# Patient Record
Sex: MALE | Race: WHITE | NOT HISPANIC OR LATINO | URBAN - METROPOLITAN AREA
[De-identification: names, ages, dates, MRNs, and addresses within clinical notes are randomized per-mention and may not be internally consistent; named-entity substitution may affect disease eponyms.]

---

## 2017-05-14 ENCOUNTER — INPATIENT (INPATIENT)
Facility: HOSPITAL | Age: 22
LOS: 0 days | Discharge: LEFT BEFORE DISCHARGE | DRG: 918 | End: 2017-05-14
Attending: HOSPITALIST | Admitting: HOSPITALIST
Payer: COMMERCIAL

## 2017-05-14 VITALS
DIASTOLIC BLOOD PRESSURE: 77 MMHG | HEART RATE: 77 BPM | RESPIRATION RATE: 19 BRPM | OXYGEN SATURATION: 100 % | TEMPERATURE: 98 F | SYSTOLIC BLOOD PRESSURE: 131 MMHG

## 2017-05-14 VITALS
DIASTOLIC BLOOD PRESSURE: 94 MMHG | HEIGHT: 72.83 IN | HEART RATE: 110 BPM | WEIGHT: 187.39 LBS | OXYGEN SATURATION: 100 % | RESPIRATION RATE: 20 BRPM | TEMPERATURE: 99 F | SYSTOLIC BLOOD PRESSURE: 151 MMHG

## 2017-05-14 DIAGNOSIS — Z29.9 ENCOUNTER FOR PROPHYLACTIC MEASURES, UNSPECIFIED: ICD-10-CM

## 2017-05-14 DIAGNOSIS — R07.9 CHEST PAIN, UNSPECIFIED: ICD-10-CM

## 2017-05-14 DIAGNOSIS — F17.200 NICOTINE DEPENDENCE, UNSPECIFIED, UNCOMPLICATED: ICD-10-CM

## 2017-05-14 DIAGNOSIS — F14.10 COCAINE ABUSE, UNCOMPLICATED: ICD-10-CM

## 2017-05-14 LAB
ALBUMIN SERPL ELPH-MCNC: 4.3 G/DL — SIGNIFICANT CHANGE UP (ref 3.5–5)
ALP SERPL-CCNC: 88 U/L — SIGNIFICANT CHANGE UP (ref 40–120)
ALT FLD-CCNC: 58 U/L DA — SIGNIFICANT CHANGE UP (ref 10–60)
ANION GAP SERPL CALC-SCNC: 6 MMOL/L — SIGNIFICANT CHANGE UP (ref 5–17)
AST SERPL-CCNC: 25 U/L — SIGNIFICANT CHANGE UP (ref 10–40)
BASOPHILS # BLD AUTO: 0.1 K/UL — SIGNIFICANT CHANGE UP (ref 0–0.2)
BASOPHILS NFR BLD AUTO: 0.5 % — SIGNIFICANT CHANGE UP (ref 0–2)
BILIRUB SERPL-MCNC: 1 MG/DL — SIGNIFICANT CHANGE UP (ref 0.2–1.2)
BUN SERPL-MCNC: 11 MG/DL — SIGNIFICANT CHANGE UP (ref 7–18)
CALCIUM SERPL-MCNC: 9.4 MG/DL — SIGNIFICANT CHANGE UP (ref 8.4–10.5)
CHLORIDE SERPL-SCNC: 106 MMOL/L — SIGNIFICANT CHANGE UP (ref 96–108)
CK MB BLD-MCNC: 1.2 % — SIGNIFICANT CHANGE UP (ref 0–3.5)
CK MB BLD-MCNC: 1.3 % — SIGNIFICANT CHANGE UP (ref 0–3.5)
CK MB CFR SERPL CALC: 3.5 NG/ML — SIGNIFICANT CHANGE UP (ref 0–3.6)
CK MB CFR SERPL CALC: 3.5 NG/ML — SIGNIFICANT CHANGE UP (ref 0–3.6)
CK SERPL-CCNC: 265 U/L — HIGH (ref 35–232)
CK SERPL-CCNC: 290 U/L — HIGH (ref 35–232)
CO2 SERPL-SCNC: 26 MMOL/L — SIGNIFICANT CHANGE UP (ref 22–31)
CREAT SERPL-MCNC: 0.75 MG/DL — SIGNIFICANT CHANGE UP (ref 0.5–1.3)
EOSINOPHIL # BLD AUTO: 0 K/UL — SIGNIFICANT CHANGE UP (ref 0–0.5)
EOSINOPHIL NFR BLD AUTO: 0.3 % — SIGNIFICANT CHANGE UP (ref 0–6)
GLUCOSE SERPL-MCNC: 87 MG/DL — SIGNIFICANT CHANGE UP (ref 70–99)
HCT VFR BLD CALC: 48.3 % — SIGNIFICANT CHANGE UP (ref 39–50)
HGB BLD-MCNC: 16.3 G/DL — SIGNIFICANT CHANGE UP (ref 13–17)
LYMPHOCYTES # BLD AUTO: 0.9 K/UL — LOW (ref 1–3.3)
LYMPHOCYTES # BLD AUTO: 8.7 % — LOW (ref 13–44)
MCHC RBC-ENTMCNC: 30.6 PG — SIGNIFICANT CHANGE UP (ref 27–34)
MCHC RBC-ENTMCNC: 33.8 GM/DL — SIGNIFICANT CHANGE UP (ref 32–36)
MCV RBC AUTO: 90.6 FL — SIGNIFICANT CHANGE UP (ref 80–100)
MONOCYTES # BLD AUTO: 0.7 K/UL — SIGNIFICANT CHANGE UP (ref 0–0.9)
MONOCYTES NFR BLD AUTO: 6.7 % — SIGNIFICANT CHANGE UP (ref 2–14)
NEUTROPHILS # BLD AUTO: 8.3 K/UL — HIGH (ref 1.8–7.4)
NEUTROPHILS NFR BLD AUTO: 83.8 % — HIGH (ref 43–77)
PLATELET # BLD AUTO: 228 K/UL — SIGNIFICANT CHANGE UP (ref 150–400)
POTASSIUM SERPL-MCNC: 4 MMOL/L — SIGNIFICANT CHANGE UP (ref 3.5–5.3)
POTASSIUM SERPL-SCNC: 4 MMOL/L — SIGNIFICANT CHANGE UP (ref 3.5–5.3)
PROT SERPL-MCNC: 7.9 G/DL — SIGNIFICANT CHANGE UP (ref 6–8.3)
RBC # BLD: 5.33 M/UL — SIGNIFICANT CHANGE UP (ref 4.2–5.8)
RBC # FLD: 11 % — SIGNIFICANT CHANGE UP (ref 10.3–14.5)
SODIUM SERPL-SCNC: 138 MMOL/L — SIGNIFICANT CHANGE UP (ref 135–145)
TROPONIN I SERPL-MCNC: <0.015 NG/ML — SIGNIFICANT CHANGE UP (ref 0–0.04)
TROPONIN I SERPL-MCNC: <0.015 NG/ML — SIGNIFICANT CHANGE UP (ref 0–0.04)
WBC # BLD: 9.9 K/UL — SIGNIFICANT CHANGE UP (ref 3.8–10.5)
WBC # FLD AUTO: 9.9 K/UL — SIGNIFICANT CHANGE UP (ref 3.8–10.5)

## 2017-05-14 PROCEDURE — 71010: CPT | Mod: 26

## 2017-05-14 PROCEDURE — 71045 X-RAY EXAM CHEST 1 VIEW: CPT

## 2017-05-14 PROCEDURE — 84484 ASSAY OF TROPONIN QUANT: CPT

## 2017-05-14 PROCEDURE — 85027 COMPLETE CBC AUTOMATED: CPT

## 2017-05-14 PROCEDURE — 80053 COMPREHEN METABOLIC PANEL: CPT

## 2017-05-14 PROCEDURE — 82550 ASSAY OF CK (CPK): CPT

## 2017-05-14 PROCEDURE — 99285 EMERGENCY DEPT VISIT HI MDM: CPT

## 2017-05-14 PROCEDURE — 82553 CREATINE MB FRACTION: CPT

## 2017-05-14 PROCEDURE — 93005 ELECTROCARDIOGRAM TRACING: CPT

## 2017-05-14 PROCEDURE — 99285 EMERGENCY DEPT VISIT HI MDM: CPT | Mod: 25

## 2017-05-14 RX ORDER — NICOTINE POLACRILEX 2 MG
1 GUM BUCCAL DAILY
Qty: 0 | Refills: 0 | Status: DISCONTINUED | OUTPATIENT
Start: 2017-05-14 | End: 2017-05-14

## 2017-05-14 RX ORDER — ATORVASTATIN CALCIUM 80 MG/1
40 TABLET, FILM COATED ORAL AT BEDTIME
Qty: 0 | Refills: 0 | Status: DISCONTINUED | OUTPATIENT
Start: 2017-05-14 | End: 2017-05-14

## 2017-05-14 RX ORDER — DIPHENHYDRAMINE HCL 50 MG
50 CAPSULE ORAL ONCE
Qty: 0 | Refills: 0 | Status: DISCONTINUED | OUTPATIENT
Start: 2017-05-14 | End: 2017-05-14

## 2017-05-14 RX ORDER — ASPIRIN/CALCIUM CARB/MAGNESIUM 324 MG
81 TABLET ORAL DAILY
Qty: 0 | Refills: 0 | Status: DISCONTINUED | OUTPATIENT
Start: 2017-05-14 | End: 2017-05-14

## 2017-05-14 RX ADMIN — ATORVASTATIN CALCIUM 40 MILLIGRAM(S): 80 TABLET, FILM COATED ORAL at 22:50

## 2017-05-14 RX ADMIN — Medication 1 PATCH: at 22:49

## 2017-05-14 NOTE — DISCHARGE NOTE ADULT - HOSPITAL COURSE
23 yo M   no significant past medical history, healthy and active tennis and  presents to hospital with chest pain. Last night, patient was out with friends on [address] watching the [facility] [place] event. They started with drinking beer at a bar which later progressed to smoking weed and snorting cocaine as the night progressed. Patient was up all night partying and in the morning, when he was trying to sleep, patient experienced chest pain. Patient described it as left sided chest heaviness with palpations that worsening with walking and exertion. Pt was admmitted due to chest pain for ACS rule out.     Problem/Plan - 1:  ·  Problem: Chest pain.  Plan: Pain is currently gone  EKG shows tachycardia of 103 with incomplete right bundle branch block - unknown if new  -CE negatives first 2 sets   no BB due to cocaine abuse  No further work up could be done but pt wanted to leave AMA because he was tired of waiting to much and also felt much better. Pt was explained the risks of leaving AMA and pt understood but wanted to leave regardless.   Pt was given all labs from ER so that he can follow up with primary care physician as outpatient      EKG shows NSR  -Trop 1 neg  -f/u Trop 2, Trop 3  -f/u TSH, A1c, Lipid panel  -will get echocardiogram  -will start on ASA, lipitor  -no BB for recent cocaine use.       ·  Cocaine use: Patient counselled on quitting.       ·   Smoking: pt is a 1ppd smoker. Counselled about quitting but pt is no interested at  the time 23 yo M   no significant past medical history, healthy and active tennis and  presents to hospital with chest pain. Last night, patient was out with friends on [address] watching the [facility] [place] event. They started with drinking beer at a bar which later progressed to smoking weed and snorting cocaine as the night progressed. Patient was up all night partying and in the morning, when he was trying to sleep, patient experienced chest pain. Patient described it as left sided chest heaviness with palpations that worsening with walking and exertion. Pt was admmitted due to chest pain for ACS rule out.     Problem/Plan - 1:  ·  Problem: Chest pain.  Plan: Pain is currently gone  most likely 2/2 cocaine abuse   EKG shows tachycardia of 103 with incomplete right bundle branch block - unknown if new  -CE negatives first 2 sets   no BB due to cocaine abuse  No further work up could be done but pt wanted to leave AMA because he was tired of waiting to much and also felt much better. Pt was explained the risks of leaving AMA and pt understood but wanted to leave regardless.   Pt was given all labs from ER so that he can follow up with primary care physician as outpatient      EKG shows NSR  -Trop 1 neg  -f/u Trop 2, Trop 3  -f/u TSH, A1c, Lipid panel  -will get echocardiogram  -will start on ASA, lipitor  -no BB for recent cocaine use.       ·  Cocaine use: Patient counselled on quitting.       ·   Smoking: pt is a 1ppd smoker. Counselled about quitting but pt is no interested at  the time 23 yo M   no significant past medical history, healthy and active tennis and  presents to hospital with chest pain. Last night, patient was out with friends on [address] watching the [facility] [place] event. They started with drinking beer at a bar which later progressed to smoking weed and snorting cocaine as the night progressed. Patient was up all night partying and in the morning, when he was trying to sleep, patient experienced chest pain. Patient described it as left sided chest heaviness with palpations that worsening with walking and exertion. Pt was admmitted due to chest pain for ACS rule out.     Problem/Plan - 1:  ·  Problem: Chest pain.  Plan: Pain is currently gone  most likely 2/2 cocaine abuse   EKG shows tachycardia of 103 with incomplete right bundle branch block - unknown if new  -CE negatives first 2 sets   no BB due to cocaine abuse  No further work up could be done but pt wanted to leave AMA because he was tired of waiting to much and also felt much better. Pt was explained the risks of leaving AMA and pt understood but wanted to leave regardless.   Pt was given all labs from ER so that he can follow up with primary care physician as outpatient      EKG shows NSR  -Trop 1 neg  -f/u Trop 2, Trop 3  -f/u TSH, A1c, Lipid panel  -will get echocardiogram  -will start on ASA, lipitor  -no BB for recent cocaine use.       ·  Cocaine use: Patient counselled on quitting.       ·   Smoking: pt is a 1ppd smoker. Counselled about quitting but pt is no interested at  the time.        I DID NOT HAVE THE OPPORTUNITY OF CARING FOR THIS PATIENT AS THE PATIENT LEFT AGAINST [facility] BEFORE I WAS ABLE TO MEET THE PATIENT.

## 2017-05-14 NOTE — H&P ADULT - HISTORY OF PRESENT ILLNESS
22M from home with no significant past medical history, healthy and active tennis and  presents to hospital with chest pain. Last night, patient was out with friends on [address] watching the [facility] [place] event. They started with drinking beer at a bar which later progressed to smoking weed and snorting cocaine as the night progressed. Patient was up all night partying and in the morning, when he was trying to sleep, patient experienced chest pain. Patient described it as left sided chest heaviness with palpations that worsening with walking and exertion. Patient initially went to [facility] but was sent here after describing complaints.  Denies any surgical history  Denies any known allergies  Is not on any medications

## 2017-05-14 NOTE — H&P ADULT - PROBLEM SELECTOR PLAN 3
Patient is a 1ppd smoker. Counselled about quitting but patient currently not ready as of yet  -will give nicotine patch for now to stave off withdrawal

## 2017-05-14 NOTE — DISCHARGE NOTE ADULT - PATIENT PORTAL LINK FT
“You can access the [facility] Patient Portal, offered by [facility], by registering with the following website: [email or web address]”

## 2017-05-14 NOTE — DISCHARGE NOTE ADULT - CARE PLAN
Principal Discharge DX:	Chest pain  Goal:	avoid new events  Instructions for follow-up, activity and diet:	Yo had chest pain due to unknown reasons at the time. You were admitted to due further work up and try to assess the problem. We will respect your desire to go AMA. You were explained the risks of doing so and you understood. All lab results will be provided to you so you can follow up with primary care physician as outpatient  Secondary Diagnosis:	Cocaine use  Goal:	quit  Instructions for follow-up, activity and diet:	Pt was counseled to quit cocaine abuse.

## 2017-05-14 NOTE — H&P ADULT - ASSESSMENT
22M from home with no PMH presents with chest pain. Chest pain is likely related to cocaine use but will admit for rule out ACS.

## 2017-05-14 NOTE — DISCHARGE NOTE ADULT - PLAN OF CARE
avoid new events Yo had chest pain due to unknown reasons at the time. You were admitted to due further work up and try to assess the problem. We will respect your desire to go AMA. You were explained the risks of doing so and you understood. All lab results will be provided to you so you can follow up with primary care physician as outpatient quit Pt was counseled to quit cocaine abuse.

## 2017-05-14 NOTE — ED ADULT NURSE REASSESSMENT NOTE - NS ED NURSE REASSESS COMMENT FT1
PT HAS BEEN REQUESTING TO SPEAK WITH ADMITTING MD .   PGY 2 LEXY, AND HASAN PAGED WITH NO RESPONSE . WILL CONTINUE TO TRY AND CONTACT MD. YO RN TAKING OVER MADE AWARE OF SAME

## 2017-05-14 NOTE — H&P ADULT - NSHPSOCIALHISTORY_GEN_ALL_CORE
Social: for the past month, patient drinks 2 bottles of beer a day. He is a 1ppd smoker and uses cocaine every 3-4 months and smokes marijuana as well

## 2017-05-14 NOTE — H&P ADULT - NSHPPHYSICALEXAM_GEN_ALL_CORE
General: WN/WD NAD  Neurology: A&Ox3  Respiratory: CTA B/L, no wheezes, no rhonchi, no rales  CV: RRR, S1S2, no murmur  Abdominal: Soft, NT, ND no palpable mass, bowel sounds positive  MSK: No edema, + peripheral pulses

## 2017-05-14 NOTE — ED PROVIDER NOTE - OBJECTIVE STATEMENT
21 yo male no PMH presents with chest pain x 4 hours afte cocaine use.  Pt used 0.7 grams of cocaine at 4 am, smoked marijuana at 6 am and had cP x 4 hours then.  Pt also drank alcohol last night.  He has no other PMD, no family hx of CAD

## 2017-05-14 NOTE — H&P ADULT - NSHPREVIEWOFSYSTEMS_GEN_ALL_CORE
ROS: patient complains of headache as well but denies SOB, nausea/vomiting, issues with urination or defecation or abdominal pain

## 2017-05-14 NOTE — H&P ADULT - PROBLEM SELECTOR PLAN 1
Pain is currently gone  EKG shows NSR  -Trop 1 neg  -f/u Trop 2, Trop 3  -f/u TSH, A1c, Lipid panel  -will get echocardiogram  -will start on ASA, lipitor  -no BB for recent cocaine use Pain is currently gone  EKG shows tachycardia of 103 with incomplete right bundle branch block - unknown if new  -Trop 1 neg  -f/u Trop 2, Trop 3  -f/u TSH, A1c, Lipid panel  -will get echocardiogram  -will start on ASA, lipitor  -no BB for recent cocaine use

## 2017-05-17 DIAGNOSIS — F17.210 NICOTINE DEPENDENCE, CIGARETTES, UNCOMPLICATED: ICD-10-CM

## 2017-05-17 DIAGNOSIS — R00.0 TACHYCARDIA, UNSPECIFIED: ICD-10-CM

## 2017-05-17 DIAGNOSIS — R07.89 OTHER CHEST PAIN: ICD-10-CM

## 2017-05-17 DIAGNOSIS — F14.10 COCAINE ABUSE, UNCOMPLICATED: ICD-10-CM

## 2017-05-17 DIAGNOSIS — I45.10 UNSPECIFIED RIGHT BUNDLE-BRANCH BLOCK: ICD-10-CM
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
## 2017-05-18 DIAGNOSIS — T40.5X1A POISONING BY COCAINE, ACCIDENTAL (UNINTENTIONAL), INITIAL ENCOUNTER: ICD-10-CM

## 2024-10-31 ENCOUNTER — NON-APPOINTMENT (OUTPATIENT)
Age: 29
End: 2024-10-31

## 2024-10-31 ENCOUNTER — APPOINTMENT (OUTPATIENT)
Dept: PULMONOLOGY | Facility: CLINIC | Age: 29
End: 2024-10-31
Payer: MEDICAID

## 2024-10-31 VITALS
SYSTOLIC BLOOD PRESSURE: 119 MMHG | RESPIRATION RATE: 13 BRPM | HEIGHT: 72 IN | BODY MASS INDEX: 27.63 KG/M2 | OXYGEN SATURATION: 98 % | WEIGHT: 204 LBS | DIASTOLIC BLOOD PRESSURE: 77 MMHG | TEMPERATURE: 98.3 F | HEART RATE: 77 BPM

## 2024-10-31 DIAGNOSIS — R06.02 SHORTNESS OF BREATH: ICD-10-CM

## 2024-10-31 DIAGNOSIS — J45.40 MODERATE PERSISTENT ASTHMA, UNCOMPLICATED: ICD-10-CM

## 2024-10-31 DIAGNOSIS — F17.200 NICOTINE DEPENDENCE, UNSPECIFIED, UNCOMPLICATED: ICD-10-CM

## 2024-10-31 PROBLEM — Z00.00 ENCOUNTER FOR PREVENTIVE HEALTH EXAMINATION: Status: ACTIVE | Noted: 2024-10-31

## 2024-10-31 PROCEDURE — G2211 COMPLEX E/M VISIT ADD ON: CPT | Mod: NC

## 2024-10-31 PROCEDURE — 99204 OFFICE O/P NEW MOD 45 MIN: CPT

## 2024-10-31 RX ORDER — BUDESONIDE AND FORMOTEROL FUMARATE DIHYDRATE 160; 4.5 UG/1; UG/1
160-4.5 AEROSOL RESPIRATORY (INHALATION) EVERY 6 HOURS
Qty: 1 | Refills: 6 | Status: ACTIVE | COMMUNITY
Start: 2024-10-31 | End: 1900-01-01

## 2024-11-05 DIAGNOSIS — R04.2 HEMOPTYSIS: ICD-10-CM

## 2024-11-15 ENCOUNTER — APPOINTMENT (OUTPATIENT)
Dept: CT IMAGING | Facility: CLINIC | Age: 29
End: 2024-11-15
Payer: MEDICAID

## 2024-11-15 ENCOUNTER — OUTPATIENT (OUTPATIENT)
Dept: OUTPATIENT SERVICES | Facility: HOSPITAL | Age: 29
LOS: 1 days | End: 2024-11-15

## 2024-11-15 PROCEDURE — 71275 CT ANGIOGRAPHY CHEST: CPT | Mod: 26

## 2025-02-13 ENCOUNTER — APPOINTMENT (OUTPATIENT)
Dept: PULMONOLOGY | Facility: CLINIC | Age: 30
End: 2025-02-13

## 2025-07-18 NOTE — ED ADULT TRIAGE NOTE - CHIEF COMPLAINT QUOTE
conjuntivae and eyelids appear normal, sclerae white without injection
chest discomfort s/p used cocaine x last night.